# Patient Record
Sex: FEMALE | Race: WHITE | Employment: FULL TIME | URBAN - METROPOLITAN AREA
[De-identification: names, ages, dates, MRNs, and addresses within clinical notes are randomized per-mention and may not be internally consistent; named-entity substitution may affect disease eponyms.]

---

## 2019-11-27 ENCOUNTER — THERAPY VISIT (OUTPATIENT)
Dept: PHYSICAL THERAPY | Facility: CLINIC | Age: 49
End: 2019-11-27
Payer: COMMERCIAL

## 2019-11-27 DIAGNOSIS — M54.42 ACUTE LEFT-SIDED LOW BACK PAIN WITH LEFT-SIDED SCIATICA: ICD-10-CM

## 2019-11-27 PROCEDURE — 97140 MANUAL THERAPY 1/> REGIONS: CPT | Mod: GP | Performed by: PHYSICAL THERAPIST

## 2019-11-27 PROCEDURE — 97110 THERAPEUTIC EXERCISES: CPT | Mod: GP | Performed by: PHYSICAL THERAPIST

## 2019-11-27 PROCEDURE — 97161 PT EVAL LOW COMPLEX 20 MIN: CPT | Mod: GP | Performed by: PHYSICAL THERAPIST

## 2019-11-27 NOTE — LETTER
"[facility] PHYSICAL THERAPY  [address]  [facility] [address]  [phone]    2019    Re: Liliya Avilez   :   1970  MRN:  [id number]   REFERRING PHYSICIAN:   Travis Acevedo    [facility] PHYSICAL [facility]    Date of Initial Evaluation:  2019  Visits:  Rxs Used: 1  Reason for Referral:  Acute left-sided low back pain with left-sided sciatica    EVALUATION SUMMARY    [facility] Initial Evaluation  Subjective:  Liliya Avilez is a 49 year old female.    Patient s chief complaints: low back pain.    Condition occurred due to over use, walk-a-thon, raking/yardwork and moving bricks.  Date of Onset: around 19  Location of symptoms is left low back and into gluteal area.  Noted some L hamstring/ITB tightness prior during the summer .  Symptoms other than pain include: tingling lumbosacral area,  Denies changes in bowel/bladder.  Quality of pain is sharp and frequency is intermittent, but present most of the time this past week. .    Pain dependence on time of day is: not dependent on time of day.   Pain rating is: 10/10.    Symptoms are exacerbated by: sitting, lifting, vacuuming, standing and walking (though sitting/arising is worse than standing/walking).    Symptoms are relieved by:  Sometimes bridging, cobra.    Progression of symptoms is that symptoms are:  Worse today--\"it is a bad day\".  Imaging/Special tests include: none   Previous treatments include: chiropractic care for back, but will hold until pain calms down.   Patient reports that general health is: good .   Pertinent medical history includes:  Headaches, depression/anxiety, foot fracture, osteopenia  Medical allergies includes: nickel.   Surgical history includes: wisdom teeth.  Current medications include: antidepressant, ibuprofen, sleep  Current occupation is:  (does also do some teaching for exercise " classes)  Work status is: working normal job/hours  Primary job tasks include: computer work, driving, lift/carry, sitting/standing, pulling, repetitive work  Barriers include: none  Red flags include: none    Patient's expectations for therapy include: get stretches that help, be painfree, eventual return to lifting weights.   HPI                  Objective:  LUMBAR:  Posture: changes posture and unweight RLside to avoid pain currently  Posture Correction: not assessed  Relevant Lateral Shift: no  Neurological:  Motor Deficit:  Myotomes L R   L1-2 (hip flexion) 5 5   L3 (knee extension) 5 5   L4 (ankle DF) 5 5   L5 (g. toe ext) 5 5   S1 (ankle PF or knee flex) 5 5   Sensory Deficit, Reflexes: intact light touch screen B LE dermatomes  Dural Signs:   L R   Slump negative negative   SLR negative negative   AROM: (Major, Moderate, Minimal or Nil loss)  Movement Loss Joey Mod Min Nil Pain   Flexion  X   Flexion to knees with pain LB, L buttock to post knee   Extension  X   Pain L LB   Side Gliding L   X  Trace discomfort LB   Side Gliding R   X  Trace discomfort LB   Repeated movement testing:   (During: produces, abolishes, increases, decreases, no effect, centralizing, peripheralizing; After: better, worse, no better, no worse, no effect, centralized, peripheralized)  Pre-test Symptoms Standing: L LBP   Symptoms During Symptoms After ROM increased ROM decreased No Effect   FIS        Rep FIS Increased L LB, produced pain post thigh to knee No worse   X   EIS        Rep EIS Increased sharp pain L LB at end range No worse   X   Static Tests: prone lying is with decreased pain/better; SYLVIA is about the same, but back muscles tighten up; modified press up in prone with hands about 12 inches higher than shoulder is well tolerated no effect/no effect during/after  Provisional Classification: likely derangement, but still primarily in acute LBP phase  Principle of Management: will initiate prone progression with prone lying,  SYLVIA and partial/modified press up.  Will establish directional response and move into postural education and core strengthening as indicated.     Assessment/Plan:    Patient is a 49 year old female with lumbar complaints.    Patient has the following significant findings with corresponding treatment plan.                Diagnosis 1:  Acute L LBP    Pain -  hot/cold therapy, electric stimulation, manual therapy and directional preference exercise  Decreased ROM/flexibility - manual therapy and therapeutic exercise  Decreased strength - therapeutic exercise and therapeutic activities  Decreased proprioception - neuro re-education and therapeutic activities  Decreased function - therapeutic activities  Impaired posture - neuro re-education    Therapy Evaluation Codes:   1) History comprised of:   Personal factors that impact the plan of care:      None.    Comorbidity factors that impact the plan of care are:      None.     Medications impacting care: None.  2) Examination of Body Systems comprised of:   Body structures and functions that impact the plan of care:      Lumbar spine.   Activity limitations that impact the plan of care are:      Bathing, Bending, Dressing, Lifting, Running, Sitting, Sports, Squatting/kneeling, Stairs, Walking, Sleeping and Laying down.  3) Clinical presentation characteristics are:   Stable/Uncomplicated.  4) Decision-Making    Low complexity using standardized patient assessment instrument and/or measureable assessment of functional outcome.  Cumulative Therapy Evaluation is: Low complexity.    Previous and current functional limitations:  (See Goal Flow Sheet for this information)    Short term and Long term goals: (See Goal Flow Sheet for this information)     Communication ability:  Patient appears to be able to clearly communicate and understand verbal and written communication and follow directions correctly.  Treatment Explanation - The following has been discussed with the  patient:   RX ordered/plan of care  Anticipated outcomes  Possible risks and side effects  This patient would benefit from PT intervention to resume normal activities.   Rehab potential is good.    Frequency:  1 X week, once daily  Duration:  for 6 weeks  Discharge Plan:  Achieve all LTG.  Independent in home treatment program.  Reach maximal therapeutic benefit.    Thank you for your referral.    INQUIRIES  Therapist: Jamar Aguilera DPT,SCS, Cert MDT  [facility] PHYSICAL THERAPY  [address] [place] [address]  Phone: [phone]  Fax: [phone]

## 2019-11-27 NOTE — PROGRESS NOTES
"[facility] Initial Evaluation  Subjective:  Liliya Avilez is a 49 year old female.    Patient s chief complaints: low back pain.    Condition occurred due to over use, walk-a-thon, raking/yardwork and moving bricks.  Date of Onset: around 9/30/19  Location of symptoms is left low back and into gluteal area.  Noted some L hamstring/ITB tightness prior during the summer .  Symptoms other than pain include: tingling lumbosacral area,  Denies changes in bowel/bladder.  Quality of pain is sharp and frequency is intermittent, but present most of the time this past week. .    Pain dependence on time of day is: not dependent on time of day.   Pain rating is: 10/10.    Symptoms are exacerbated by: sitting, lifting, vacuuming, standing and walking (though sitting/arising is worse than standing/walking).    Symptoms are relieved by:  Sometimes bridging, cobra.    Progression of symptoms is that symptoms are:  Worse today--\"it is a bad day\".  Imaging/Special tests include: none   Previous treatments include: chiropractic care for back, but will hold until pain calms down.   Patient reports that general health is: good .   Pertinent medical history includes:  Headaches, depression/anxiety, foot fracture, osteopenia  Medical allergies includes: nickel.   Surgical history includes: wisdom teeth.  Current medications include: antidepressant, ibuprofen, sleep  Current occupation is:  (does also do some teaching for exercise classes)  Work status is: working normal job/hours  Primary job tasks include: computer work, driving, lift/carry, sitting/standing, pulling, repetitive work  Barriers include: none  Red flags include: none    Patient's expectations for therapy include: get stretches that help, be painfree, eventual return to lifting weights.     HPI                    Objective:  LUMBAR:    Posture: changes posture and unweight RLside to avoid pain currently  Posture Correction: not " assessed  Relevant Lateral Shift: no    Neurological:    Motor Deficit:  Myotomes L R   L1-2 (hip flexion) 5 5   L3 (knee extension) 5 5   L4 (ankle DF) 5 5   L5 (g. toe ext) 5 5   S1 (ankle PF or knee flex) 5 5     Sensory Deficit, Reflexes: intact light touch screen B LE dermatomes    Dural Signs:   L R   Slump negative negative   SLR negative negative       AROM: (Major, Moderate, Minimal or Nil loss)  Movement Loss Joey Mod Min Nil Pain   Flexion  X   Flexion to knees with pain LB, L buttock to post knee   Extension  X   Pain L LB   Side Gliding L   X  Trace discomfort LB   Side Gliding R   X  Trace discomfort LB     Repeated movement testing:   (During: produces, abolishes, increases, decreases, no effect, centralizing, peripheralizing; After: better, worse, no better, no worse, no effect, centralized, peripheralized)    Pre-test Symptoms Standing: L LBP   Symptoms During Symptoms After ROM increased ROM decreased No Effect   FIS        Rep FIS Increased L LB, produced pain post thigh to knee No worse   X   EIS        Rep EIS Increased sharp pain L LB at end range No worse   X     Static Tests: prone lying is with decreased pain/better; SYLVIA is about the same, but back muscles tighten up; modified press up in prone with hands about 12 inches higher than shoulder is well tolerated no effect/no effect during/after    Provisional Classification: likely derangement, but still primarily in acute LBP phase  Principle of Management: will initiate prone progression with prone lying, SYLVIA and partial/modified press up.  Will establish directional response and move into postural education and core strengthening as indicated.     System    Physical Exam    General     ROS    Assessment/Plan:    Patient is a 49 year old female with lumbar complaints.    Patient has the following significant findings with corresponding treatment plan.                Diagnosis 1:  Acute L LBP    Pain -  hot/cold therapy, electric stimulation,  manual therapy and directional preference exercise  Decreased ROM/flexibility - manual therapy and therapeutic exercise  Decreased strength - therapeutic exercise and therapeutic activities  Decreased proprioception - neuro re-education and therapeutic activities  Decreased function - therapeutic activities  Impaired posture - neuro re-education    Therapy Evaluation Codes:   1) History comprised of:   Personal factors that impact the plan of care:      None.    Comorbidity factors that impact the plan of care are:      None.     Medications impacting care: None.  2) Examination of Body Systems comprised of:   Body structures and functions that impact the plan of care:      Lumbar spine.   Activity limitations that impact the plan of care are:      Bathing, Bending, Dressing, Lifting, Running, Sitting, Sports, Squatting/kneeling, Stairs, Walking, Sleeping and Laying down.  3) Clinical presentation characteristics are:   Stable/Uncomplicated.  4) Decision-Making    Low complexity using standardized patient assessment instrument and/or measureable assessment of functional outcome.  Cumulative Therapy Evaluation is: Low complexity.    Previous and current functional limitations:  (See Goal Flow Sheet for this information)    Short term and Long term goals: (See Goal Flow Sheet for this information)     Communication ability:  Patient appears to be able to clearly communicate and understand verbal and written communication and follow directions correctly.  Treatment Explanation - The following has been discussed with the patient:   RX ordered/plan of care  Anticipated outcomes  Possible risks and side effects  This patient would benefit from PT intervention to resume normal activities.   Rehab potential is good.    Frequency:  1 X week, once daily  Duration:  for 6 weeks  Discharge Plan:  Achieve all LTG.  Independent in home treatment program.  Reach maximal therapeutic benefit.    Please refer to the daily flowsheet  for treatment today, total treatment time and time spent performing 1:1 timed codes.

## 2019-11-28 PROBLEM — M54.42 ACUTE LEFT-SIDED LOW BACK PAIN WITH LEFT-SIDED SCIATICA: Status: ACTIVE | Noted: 2019-11-28

## 2019-12-04 ENCOUNTER — THERAPY VISIT (OUTPATIENT)
Dept: PHYSICAL THERAPY | Facility: CLINIC | Age: 49
End: 2019-12-04
Payer: COMMERCIAL

## 2019-12-04 DIAGNOSIS — M54.42 ACUTE LEFT-SIDED LOW BACK PAIN WITH LEFT-SIDED SCIATICA: ICD-10-CM

## 2019-12-04 PROCEDURE — 97140 MANUAL THERAPY 1/> REGIONS: CPT | Mod: GP | Performed by: PHYSICAL THERAPIST

## 2019-12-04 PROCEDURE — 97110 THERAPEUTIC EXERCISES: CPT | Mod: GP | Performed by: PHYSICAL THERAPIST

## 2019-12-12 ENCOUNTER — THERAPY VISIT (OUTPATIENT)
Dept: PHYSICAL THERAPY | Facility: CLINIC | Age: 49
End: 2019-12-12
Payer: COMMERCIAL

## 2019-12-12 DIAGNOSIS — M54.42 ACUTE LEFT-SIDED LOW BACK PAIN WITH LEFT-SIDED SCIATICA: ICD-10-CM

## 2019-12-12 PROCEDURE — 97140 MANUAL THERAPY 1/> REGIONS: CPT | Mod: GP | Performed by: PHYSICAL THERAPIST

## 2019-12-12 PROCEDURE — 97110 THERAPEUTIC EXERCISES: CPT | Mod: GP | Performed by: PHYSICAL THERAPIST

## 2019-12-18 ENCOUNTER — THERAPY VISIT (OUTPATIENT)
Dept: PHYSICAL THERAPY | Facility: CLINIC | Age: 49
End: 2019-12-18
Payer: COMMERCIAL

## 2019-12-18 DIAGNOSIS — M54.42 ACUTE LEFT-SIDED LOW BACK PAIN WITH LEFT-SIDED SCIATICA: ICD-10-CM

## 2019-12-18 PROCEDURE — 97140 MANUAL THERAPY 1/> REGIONS: CPT | Mod: GP | Performed by: PHYSICAL THERAPIST

## 2019-12-18 PROCEDURE — 97110 THERAPEUTIC EXERCISES: CPT | Mod: GP | Performed by: PHYSICAL THERAPIST

## 2019-12-26 ENCOUNTER — THERAPY VISIT (OUTPATIENT)
Dept: PHYSICAL THERAPY | Facility: CLINIC | Age: 49
End: 2019-12-26
Payer: COMMERCIAL

## 2019-12-26 DIAGNOSIS — M54.42 ACUTE LEFT-SIDED LOW BACK PAIN WITH LEFT-SIDED SCIATICA: ICD-10-CM

## 2019-12-26 PROCEDURE — 97110 THERAPEUTIC EXERCISES: CPT | Mod: GP | Performed by: PHYSICAL THERAPIST

## 2019-12-26 PROCEDURE — 97140 MANUAL THERAPY 1/> REGIONS: CPT | Mod: GP | Performed by: PHYSICAL THERAPIST

## 2020-01-02 ENCOUNTER — THERAPY VISIT (OUTPATIENT)
Dept: PHYSICAL THERAPY | Facility: CLINIC | Age: 50
End: 2020-01-02
Payer: COMMERCIAL

## 2020-01-02 DIAGNOSIS — M54.42 ACUTE LEFT-SIDED LOW BACK PAIN WITH LEFT-SIDED SCIATICA: ICD-10-CM

## 2020-01-02 PROCEDURE — 97140 MANUAL THERAPY 1/> REGIONS: CPT | Mod: GP | Performed by: PHYSICAL THERAPIST

## 2020-01-02 PROCEDURE — 97110 THERAPEUTIC EXERCISES: CPT | Mod: GP | Performed by: PHYSICAL THERAPIST

## 2020-01-09 ENCOUNTER — THERAPY VISIT (OUTPATIENT)
Dept: PHYSICAL THERAPY | Facility: CLINIC | Age: 50
End: 2020-01-09
Payer: COMMERCIAL

## 2020-01-09 DIAGNOSIS — M54.42 ACUTE LEFT-SIDED LOW BACK PAIN WITH LEFT-SIDED SCIATICA: ICD-10-CM

## 2020-01-09 PROCEDURE — 97140 MANUAL THERAPY 1/> REGIONS: CPT | Mod: GP | Performed by: PHYSICAL THERAPIST

## 2020-01-09 PROCEDURE — 97110 THERAPEUTIC EXERCISES: CPT | Mod: GP | Performed by: PHYSICAL THERAPIST

## 2020-01-14 ENCOUNTER — THERAPY VISIT (OUTPATIENT)
Dept: PHYSICAL THERAPY | Facility: CLINIC | Age: 50
End: 2020-01-14
Payer: COMMERCIAL

## 2020-01-14 DIAGNOSIS — M54.42 ACUTE LEFT-SIDED LOW BACK PAIN WITH LEFT-SIDED SCIATICA: ICD-10-CM

## 2020-01-14 PROCEDURE — 97140 MANUAL THERAPY 1/> REGIONS: CPT | Mod: GP | Performed by: PHYSICAL THERAPIST

## 2020-01-14 PROCEDURE — 97110 THERAPEUTIC EXERCISES: CPT | Mod: GP | Performed by: PHYSICAL THERAPIST

## 2020-01-17 ENCOUNTER — APPOINTMENT (OUTPATIENT)
Dept: GENERAL RADIOLOGY | Facility: CLINIC | Age: 50
End: 2020-01-17
Attending: EMERGENCY MEDICINE
Payer: COMMERCIAL

## 2020-01-17 ENCOUNTER — APPOINTMENT (OUTPATIENT)
Dept: CT IMAGING | Facility: CLINIC | Age: 50
End: 2020-01-17
Attending: EMERGENCY MEDICINE
Payer: COMMERCIAL

## 2020-01-17 ENCOUNTER — HOSPITAL ENCOUNTER (EMERGENCY)
Facility: CLINIC | Age: 50
Discharge: HOME | End: 2020-01-17
Attending: EMERGENCY MEDICINE | Admitting: EMERGENCY MEDICINE
Payer: COMMERCIAL

## 2020-01-17 VITALS
WEIGHT: 145 LBS | DIASTOLIC BLOOD PRESSURE: 72 MMHG | SYSTOLIC BLOOD PRESSURE: 113 MMHG | BODY MASS INDEX: 21.98 KG/M2 | HEIGHT: 68 IN | TEMPERATURE: 98.5 F | HEART RATE: 70 BPM | OXYGEN SATURATION: 98 % | RESPIRATION RATE: 18 BRPM

## 2020-01-17 DIAGNOSIS — S16.1XXA STRAIN OF NECK MUSCLE, INITIAL ENCOUNTER: ICD-10-CM

## 2020-01-17 DIAGNOSIS — S06.0X0A CONCUSSION WITHOUT LOSS OF CONSCIOUSNESS, INITIAL ENCOUNTER: ICD-10-CM

## 2020-01-17 DIAGNOSIS — F07.81 POST CONCUSSION SYNDROME: ICD-10-CM

## 2020-01-17 PROCEDURE — 99284 EMERGENCY DEPT VISIT MOD MDM: CPT | Mod: 25

## 2020-01-17 PROCEDURE — 72040 X-RAY EXAM NECK SPINE 2-3 VW: CPT

## 2020-01-17 PROCEDURE — 70450 CT HEAD/BRAIN W/O DYE: CPT

## 2020-01-17 RX ORDER — MECLIZINE HYDROCHLORIDE 25 MG/1
25 TABLET ORAL EVERY 6 HOURS PRN
Qty: 20 TABLET | Refills: 0 | Status: SHIPPED | OUTPATIENT
Start: 2020-01-17

## 2020-01-17 RX ORDER — ONDANSETRON 4 MG/1
4 TABLET, ORALLY DISINTEGRATING ORAL EVERY 8 HOURS PRN
Qty: 10 TABLET | Refills: 0 | Status: SHIPPED | OUTPATIENT
Start: 2020-01-17

## 2020-01-17 ASSESSMENT — ENCOUNTER SYMPTOMS
VOMITING: 0
HEADACHES: 1
NECK STIFFNESS: 1
DIZZINESS: 0
FATIGUE: 1
NAUSEA: 1
LIGHT-HEADEDNESS: 1
PHOTOPHOBIA: 1

## 2020-01-17 ASSESSMENT — MIFFLIN-ST. JEOR: SCORE: 1326.22

## 2020-01-17 NOTE — ED TRIAGE NOTES
Pt fell hitting the back of her head Moday- ( from standing- slipped. ) c/o headache and blurry vision. Tired .

## 2020-01-17 NOTE — DISCHARGE INSTRUCTIONS
Tylenol or ibuprofen for pain  Zofran for nausea  Meclizine 25 mg 3 times a day for dizziness  No exercise for the next few days  You may return to work on Monday if you are feeling better

## 2020-01-17 NOTE — ED PROVIDER NOTES
History     Chief Complaint:  Tigre Avilez is a 49 year old female with a history of degenerative disc disease of her lower back with bulging discs x3 who presents after a fall four days ago. The patient reports that she was out walking her dogs down a hill when she slipped on the ice, her feet came out from under her, she fell backwards, and her head bounced off of pure ice. She did not lose consciousness and briefly laid on the ice before getting up and returning home. Since then she notes that she has been experiencing headache, neck stiffness, slight nausea, photophobia, spaciness, fatigue, vision changes, and pressure behind her eyes. She also endorses vertigo but states this has resolved. The patient notes mild improvement of her symptoms with the use of ice and following an adjustment by her chiropractor. The patient denies vomiting. 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    Allergies:  Sulfa drugs  Amoxicillin  Codeine  Nickel  Thimerosel    Medications:    Bupropion  Amphetamine salt combo  Viibryd  Neurontin  Trintellix  Klonopin  Concerta  Ambien  Fiorenal    Past Medical History:    Acute left-sided low back pain with left-sided sciatica  Attention deficit hyperactivity disorder  Insomnia  Migraines  Foot fracture  Depression  Disorder of bone and cartilage  Otitis media  Vitamin D deficiency     Past Surgical History:    History reviewed. No pertinent surgical history.    Family History:    Father: skin cancer, cataract, diabetes, heart disease, hyperlipidemia, hypertension, kidney/bladder disease, thyroid disorder  Mother: skin cancer, cataract, clotting disorder, heart disease, hyperlipidemia, hypertension, migraines, osteoporosis, spontaneous abortions, stroke ,thyroid disorder, urinary incontinence  Brother: depression, hyperlipidemia, ADHD  Daughter:  "enriquetaen  Sister: cancer, hyperlipidemia, spontaneous abortions, urinary incontinence, depression, osteoporosis, ADHD    Social History:  Smoking status: never smoker  Alcohol use: no  Marital Status:     The patient is an , 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    Review of Systems   Constitutional: Positive for fatigue.   HENT:        Pressure behind eyes   Eyes: Positive for photophobia and visual disturbance.   Gastrointestinal: Positive for nausea. Negative for vomiting.   Musculoskeletal: Positive for neck stiffness.   Neurological: Positive for light-headedness and headaches. Negative for dizziness.        No loss of consciousness  Spaciness    All other systems reviewed and are negative.    Physical Exam     Patient Vitals for the past 24 hrs:   BP Temp Temp src Pulse Resp SpO2 Height Weight   01/17/20 1355 113/72 -- -- 70 18 98 % -- --   01/17/20 1202 131/80 98.5  F (36.9  C) Temporal 71 16 98 % 1.727 m (5' 8\") 65.8 kg (145 lb)     Physical Exam  General: Resting comfortably on the gurney  Head:  The scalp, face, and head appear normal    No significant evidence of scalp trauma or bony step-off  Eyes:  The pupils are equal, round, and reactive to light    There is no nystagmus    Extraocular muscles are intact    Conjunctivae and sclerae are normal  ENT:    The nose is normal    Pinnae are normal    The oropharynx is normal    Uvula is in the midline    No evidence of hemotympanum  Neck:  Normal range of motion    There is no rigidity noted    There is no midline cervical spine pain/tenderness    There is mild tenderness involving the paracervical muscles including the   trapezius area bilaterally.    Trachea is in the midline    No mass is detected  CV:  Regular rate and underlying rhythm     Normal S1/S2, no S3/S4    No pathological murmur detected  Resp:  Lungs are clear    There is no tachypnea    Non-labored    No rales    No wheezing   GI:  Abdomen is soft, " there is no rigidity    No distension    No tympani    No rebound tenderness     Non-surgical without peritoneal features  MS:  Normal muscular tone    Symmetric motor strength    No major joint effusions    No asymmetric leg swelling, no calf tenderness  Skin:  No rash or acute skin lesions noted  Neuro: Speech is normal and fluent    GCS 15    Normal motor function throughout  Psych:  Awake. Alert.      Normal affect.  Appropriate interactions.  Lymph: No anterior cervical lymphadenopathy noted    Emergency Department Course     Imaging:  Radiology findings were communicated with the patient who voiced understanding of the findings.    XR Cervical Spine 2/3 Views  No fractures identified. Alignment is significant for slight straightening. Mild degenerative disc disease is present at C5-C6 and C6-C7. Paraspinal soft tissues are unremarkable.  TANESHA NINA MD  Reading per radiology    CT Head w/o Contrast  No evidence of acute intracranial hemorrhage, mass, or herniation.  ELVIS BEDOYA MD  Reading per radiology    Emergency Department Course:    1202 Nursing notes and vitals reviewed.    1206 I performed an exam of the patient as documented above.     1234 The patient was sent for a CT of the head while in the emergency department, results above.     1249 The patient was sent for an xray of the cervical spine while in the emergency department, results above.     1343 Patient rechecked and updated.      Findings and plan explained to the patient. Patient discharged home with instructions regarding supportive care, medications, and reasons to return. The importance of close follow-up was reviewed. The patient was prescribed zofran and Antivert.     Impression & Plan      Medical Decision Making:  Liliya Avilez is a 49 year old female who presents to the emergency department today for evaluation of recent fall and head injury.  Patient clearly suffered a significant concussion 5 days ago.  She is having ongoing  mild postconcussive syndrome of mild headache, some trace vertigo, psychomotor slowing, mild nausea.  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  She was advised that she will need to dramatically reduce her physical exertion is this can intensify the postconcussive syndrome.  CT scan of the head was performed which shows no evidence of occipital skull fracture or intracranial hemorrhage.  Cervical spine x-rays show straightening of the normal cervical lordosis consistent with cervical strain she also has baseline mid cervical spine degenerative disc disease and loss of disc height in the mid cervical spine as noted in the radiology interpretation.  She does not have evidence of cervical radiculopathy or myelopathy.  Patient was advised that she should rest for the next 3 days until she has to teach again.  There will be no cardiovascular exercise or exertion for the next week at least.  That is both personally and with teaching.  She is clear on the instructions.  She is given meclizine and Zofran as needed for symptom control.    Diagnosis:    ICD-10-CM    1. Concussion without loss of consciousness, initial encounter S06.0X0A    2. Strain of neck muscle, initial encounter S16.1XXA    3. Post concussion syndrome F07.81      Disposition:   The patient is discharged to home.    Discharge Medications:  Discharge Medication List as of 1/17/2020  1:48 PM      START taking these medications    Details   meclizine (ANTIVERT) 25 MG tablet Take 1 tablet (25 mg) by mouth every 6 hours as needed for dizziness, Disp-20 tablet, R-0, Local Print      ondansetron (ZOFRAN ODT) 4 MG ODT tab Take 1 tablet (4 mg) by mouth every 8 hours as needed for nausea, Disp-10 tablet, R-0, Local Print           Scribe Disclosure:  Patti SHEPARD, am serving as a scribe at 12:41 PM on 1/17/2020 to document services personally performed by Cayden Ruff MD based  on my observations and the provider's statements to me.     EMERGENCY DEPARTMENT       Cayden Ruff MD  01/17/20 [id number]

## 2020-01-17 NOTE — ED AVS SNAPSHOT
Emergency Department  [address] [place] [address]  Phone:  [phone]  Fax:  [phone]                                    Liliya Avilez   MRN: [id number]    Department:   Emergency Department   Date of Visit:  1/17/2020           After Visit Summary Signature Page    I have received my discharge instructions, and my questions have been answered. I have discussed any challenges I see with this plan with the nurse or doctor.    ..........................................................................................................................................  Patient/Patient Representative Signature      ..........................................................................................................................................  Patient Representative Print Name and Relationship to Patient    ..................................................               ................................................  Date                                   Time    ..........................................................................................................................................  Reviewed by Signature/Title    ...................................................              ..............................................  Date                                               Time          22EPIC Rev 08/18

## 2020-01-29 ENCOUNTER — THERAPY VISIT (OUTPATIENT)
Dept: PHYSICAL THERAPY | Facility: CLINIC | Age: 50
End: 2020-01-29
Payer: COMMERCIAL

## 2020-01-29 DIAGNOSIS — M54.42 ACUTE LEFT-SIDED LOW BACK PAIN WITH LEFT-SIDED SCIATICA: ICD-10-CM

## 2020-01-29 PROCEDURE — 97110 THERAPEUTIC EXERCISES: CPT | Mod: GP | Performed by: PHYSICAL THERAPIST

## 2020-01-29 PROCEDURE — 97140 MANUAL THERAPY 1/> REGIONS: CPT | Mod: GP | Performed by: PHYSICAL THERAPIST

## 2020-02-05 ENCOUNTER — THERAPY VISIT (OUTPATIENT)
Dept: PHYSICAL THERAPY | Facility: CLINIC | Age: 50
End: 2020-02-05
Payer: COMMERCIAL

## 2020-02-05 DIAGNOSIS — M54.42 ACUTE LEFT-SIDED LOW BACK PAIN WITH LEFT-SIDED SCIATICA: ICD-10-CM

## 2020-02-05 DIAGNOSIS — M54.2 CERVICALGIA: ICD-10-CM

## 2020-02-05 PROCEDURE — 97140 MANUAL THERAPY 1/> REGIONS: CPT | Mod: GP | Performed by: PHYSICAL THERAPIST

## 2020-02-05 PROCEDURE — 97161 PT EVAL LOW COMPLEX 20 MIN: CPT | Mod: GP | Performed by: PHYSICAL THERAPIST

## 2020-02-05 NOTE — LETTER
[facility] [place]  [address]  [facility] [address]  [phone]    2020    Re: Liliya Avilez   :   1970  MRN:  [id number]   REFERRING PHYSICIAN:   Nataliia Napoles    [facility] PHYSICAL [facility]    Date of Initial Evaluation:  2020  Visits:     Reason for Referral:     Acute left-sided low back pain with left-sided sciatica  Cervicalgia    EVALUATION SUMMARY    [facility] Initial Evaluation  Subjective:  Liliya Avilez is a 49 year old female.    Patient s chief complaints: neck pain, HA, concussion.    Condition occurred due to walking her dog, in snowy environment outdoors.  She was at the bottom of a hill and it was icey, and she slipped and fell backwards and hit the back of her head on the ground.  Had HA prior and after.  Did go to ED and was diagnosed with concussion, neck stiffness.  Did have CT that was normal and x-ray with degenerative changes.  Date of Onset: 19  Location of symptoms is posterior neck, occipital, top of head and to orbital both sides, in neck more L than R--but feels it on both .  Symptoms other than pain include: neck pain/stiffness, headaches, photophobia, sensitivity to sound. Fogginess.   Quality of pain is aching/dull and frequency is intermittent.    Pain dependence on time of day is: worse as day progresses.   Pain rating is: 7/10.    Symptoms are exacerbated by: light and sound (sensitivity), rotation, reading/computer work, difficulty finding comfort to fall alseep.    Symptoms are relieved by:  Self massage, ice, ibuprofen, tylenol.  Essential oils, water.   Progression of symptoms is that symptoms are:  Was improving end of last week, but more painful weekend and today.  Imaging/Special tests include:   CT Scan normal.  X-ray:  IMPRESSION: No fractures identified. Alignment is significant for  slight straightening. Mild degenerative  disc disease is present at  C5-C6 and C6-C7. Paraspinal soft tissues are unremarkable.   Previous treatments include: none.   Patient reports that general health is: good .   Pertinent medical history includes:  Headaches, depression/anxiety, foot fracture, osteopenia  Medical allergies includes: nickel.   Surgical history includes: wisdom teeth.  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   HPI                    Objective:  CERVICAL:  Posture: fair.  Posture Correction: no effect  Neurological:  Motor Deficit:  Myotomes L R   C4 (shoulder elevation) 5 5   C5 (shoulder abduction) 5 5   C6 (elbow flexion) 5 5   C7 (elbow extension) 5 5   C8 (thumb extension) 5 5   T1 (finger add/abd) 5 5    Strength (lb) WNL WNL   Sensory Deficit, Reflexes, Dural Signs: intact light touch screen B UE dermatomes.  AROM: (Major, Moderate, Minimal or Nil loss)  Movement Loss Joey Mod Min Nil Pain   Protrusion    X No effect   Flexion    X No effect   Retraction   X  No effect   Extension   X  Pain post neck   Left Rotation   X  No effect   Right Rotation  X   Stiff/pain   Left Side Bending  X X  Tight/pain   Right Side bending  X X  Tight/pain   Repeated movement testing:   (During: produces, abolishes, increases, decreases, no effect, centralizing, peripheralizing; After: better, worse, no better, no worse, no effect, centralized, peripheralized)  Pre-test Symptoms Sitting: pain post neck, HA top of head and to B orbital areas.    Symptoms During Symptoms After ROM increased ROM decreased No Effect   PRO        Rep PRO        RET        Rep RET No effect No effect   X   RET EXT        Rep RET EXT        LF - R        Rep LF - R No effect No effect   X   LF - L        Rep LF - L        ROT - R        Rep ROT - R        ROT - L        Rep ROT - L        FLEX        Rep FLEX        Retraction with patient overpressure, increased pain during local to neck, no worse after, increased R rotation AROM after.  Sustained retraction: no effect on HA initially, no better/no worse  Upper cervical flexion repeated and sustained, increased pain during local, no worse after, no effect on AROM.   Other Tests: hypomobility mid/upper cervical.  Palpation with tightness post neck, suboccipitals and upper traps  Provisional Classification: other, trauma  Principle of Management: will initiate retraction in sitting repeated for neck pain and AROM; sustained can trial x 1 min for headache.  Will work on scapular stabilization, posture.  Will work on joint mobility and soft tissue mobilization as well.   Will monitor concussion symptoms and refer back to primary care provider to assess concussion and recommend services if non neck pain/HA symptoms currently present aren't decreasing with time.     Assessment/Plan:    Patient is a 49 year old female with cervical complaints.    Patient has the following significant findings with corresponding treatment plan.                Diagnosis 1:  Cervicalgia/HA after fall (concussion as well)    Pain -  hot/cold therapy, manual therapy and directional preference exercise  Decreased ROM/flexibility - manual therapy and therapeutic exercise  Decreased strength - therapeutic exercise and therapeutic activities  Decreased proprioception - neuro re-education and therapeutic activities  Decreased function - therapeutic activities    Therapy Evaluation Codes:   1) History comprised of:   Personal factors that impact the plan of care:       None.    Comorbidity factors that impact the plan of care are:      None.     Medications impacting care: None.  2) Examination of Body Systems comprised of:   Body structures and functions that impact the plan of care:      Cervical spine and Lumbar spine.   Activity limitations that impact the plan of care are:      Driving, Lifting, Reading/Computer work and Laying down.  3) Clinical presentation characteristics are:   Stable/Uncomplicated.  4) Decision-Making    Low complexity using standardized patient assessment instrument and/or measureable assessment of functional outcome.  Cumulative Therapy Evaluation is: Low complexity.    Previous and current functional limitations:  (See Goal Flow Sheet for this information)    Short term and Long term goals: (See Goal Flow Sheet for this information)     Communication ability:  Patient appears to be able to clearly communicate and understand verbal and written communication and follow directions correctly.  Treatment Explanation - The following has been discussed with the patient:   RX ordered/plan of care  Anticipated outcomes  Possible risks and side effects  This patient would benefit from PT intervention to resume normal activities.   Rehab potential is good.    Frequency:  1 X week, once daily  Duration:  for 8 weeks  Discharge Plan:  Achieve all LTG.  Independent in home treatment program.  Reach maximal therapeutic benefit.    Thank you for your referral.    INQUIRIES  Therapist: Jamar Aguilera, DPT, SCS, Cert MDT  [facility] [place] [facility]  [address] [place] [address] [address] [address]  Phone: [phone]  Fax: [phone]

## 2020-02-05 NOTE — PROGRESS NOTES
[facility] Initial Evaluation  Subjective:  Liliya Avilez is a 49 year old female.    Patient s chief complaints: neck pain, HA, concussion.    Condition occurred due to walking her dog, in snowy environment outdoors.  She was at the bottom of a hill and it was icey, and she slipped and fell backwards and hit the back of her head on the ground.  Had HA prior and after.  Did go to ED and was diagnosed with concussion, neck stiffness.  Did have CT that was normal and x-ray with degenerative changes.  Date of Onset: 1/13/19  Location of symptoms is posterior neck, occipital, top of head and to orbital both sides, in neck more L than R--but feels it on both .  Symptoms other than pain include: neck pain/stiffness, headaches, photophobia, sensitivity to sound. Fogginess.   Quality of pain is aching/dull and frequency is intermittent.    Pain dependence on time of day is: worse as day progresses.   Pain rating is: 7/10.    Symptoms are exacerbated by: light and sound (sensitivity), rotation, reading/computer work, difficulty finding comfort to fall alseep.    Symptoms are relieved by:  Self massage, ice, ibuprofen, tylenol.  Essential oils, water.   Progression of symptoms is that symptoms are:  Was improving end of last week, but more painful weekend and today.  Imaging/Special tests include:   CT Scan normal.  X-ray:  IMPRESSION: No fractures identified. Alignment is significant for  slight straightening. Mild degenerative disc disease is present at  C5-C6 and C6-C7. Paraspinal soft tissues are unremarkable.   Previous treatments include: none.   Patient reports that general health is: good .   Pertinent medical history includes:  Headaches, depression/anxiety, foot fracture, osteopenia  Medical allergies includes: nickel.   Surgical history includes: wisdom teeth.  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     HPI                    Objective:  CERVICAL:    Posture: fair.  Posture Correction: no effect    Neurological:    Motor Deficit:  Myotomes L R   C4 (shoulder elevation) 5 5   C5 (shoulder abduction) 5 5   C6 (elbow flexion) 5 5   C7 (elbow extension) 5 5   C8 (thumb extension) 5 5   T1 (finger add/abd) 5 5    Strength (lb) WNL WNL     Sensory Deficit, Reflexes, Dural Signs: intact light touch screen B UE dermatomes.    AROM: (Major, Moderate, Minimal or Nil loss)  Movement Loss Joey Mod Min Nil Pain   Protrusion    X No effect   Flexion    X No effect   Retraction   X  No effect   Extension   X  Pain post neck   Left Rotation   X  No effect   Right Rotation  X   Stiff/pain   Left Side Bending  X X  Tight/pain   Right Side bending  X X  Tight/pain     Repeated movement testing:   (During: produces, abolishes, increases, decreases, no effect, centralizing, peripheralizing; After: better, worse, no better, no worse, no effect, centralized, peripheralized)    Pre-test Symptoms Sitting: pain post neck, HA top of head and to B orbital areas.   Symptoms During Symptoms After ROM increased ROM decreased No Effect   PRO        Rep PRO        RET        Rep RET No effect No effect   X   RET EXT        Rep RET EXT        LF - R        Rep LF - R No effect No effect   X   LF - L        Rep LF - L        ROT - R        Rep ROT - R        ROT - L        Rep ROT - L        FLEX        Rep FLEX          Retraction with patient overpressure, increased pain during local to neck, no worse after, increased R  rotation AROM after.  Sustained retraction: no effect on HA initially, no better/no worse  Upper cervical flexion repeated and sustained, increased pain during local, no worse after, no effect on AROM.     Other Tests: hypomobility mid/upper cervical.  Palpation with tightness post neck, suboccipitals and upper traps    Provisional Classification: other, trauma  Principle of Management: will initiate retraction in sitting repeated for neck pain and AROM; sustained can trial x 1 min for headache.  Will work on scapular stabilization, posture.  Will work on joint mobility and soft tissue mobilization as well.   Will monitor concussion symptoms and refer back to primary care provider to assess concussion and recommend services if non neck pain/HA symptoms currently present aren't decreasing with time.       System    Physical Exam    General     ROS    Assessment/Plan:    Patient is a 49 year old female with cervical complaints.    Patient has the following significant findings with corresponding treatment plan.                Diagnosis 1:  Cervicalgia/HA after fall (concussion as well)    Pain -  hot/cold therapy, manual therapy and directional preference exercise  Decreased ROM/flexibility - manual therapy and therapeutic exercise  Decreased strength - therapeutic exercise and therapeutic activities  Decreased proprioception - neuro re-education and therapeutic activities  Decreased function - therapeutic activities    Therapy Evaluation Codes:   1) History comprised of:   Personal factors that impact the plan of care:      None.    Comorbidity factors that impact the plan of care are:      None.     Medications impacting care: None.  2) Examination of Body Systems comprised of:   Body structures and functions that impact the plan of care:      Cervical spine and Lumbar spine.   Activity limitations that impact the plan of care are:      Driving, Lifting, Reading/Computer work and Laying down.  3) Clinical  presentation characteristics are:   Stable/Uncomplicated.  4) Decision-Making    Low complexity using standardized patient assessment instrument and/or measureable assessment of functional outcome.  Cumulative Therapy Evaluation is: Low complexity.    Previous and current functional limitations:  (See Goal Flow Sheet for this information)    Short term and Long term goals: (See Goal Flow Sheet for this information)     Communication ability:  Patient appears to be able to clearly communicate and understand verbal and written communication and follow directions correctly.  Treatment Explanation - The following has been discussed with the patient:   RX ordered/plan of care  Anticipated outcomes  Possible risks and side effects  This patient would benefit from PT intervention to resume normal activities.   Rehab potential is good.    Frequency:  1 X week, once daily  Duration:  for 8 weeks  Discharge Plan:  Achieve all LTG.  Independent in home treatment program.  Reach maximal therapeutic benefit.    Please refer to the daily flowsheet for treatment today, total treatment time and time spent performing 1:1 timed codes.

## 2020-02-17 ENCOUNTER — THERAPY VISIT (OUTPATIENT)
Dept: PHYSICAL THERAPY | Facility: CLINIC | Age: 50
End: 2020-02-17
Payer: COMMERCIAL

## 2020-02-17 DIAGNOSIS — M54.42 ACUTE LEFT-SIDED LOW BACK PAIN WITH LEFT-SIDED SCIATICA: ICD-10-CM

## 2020-02-17 DIAGNOSIS — M54.2 CERVICALGIA: ICD-10-CM

## 2020-02-17 PROCEDURE — 97110 THERAPEUTIC EXERCISES: CPT | Mod: GP | Performed by: PHYSICAL THERAPIST

## 2020-02-17 PROCEDURE — 97140 MANUAL THERAPY 1/> REGIONS: CPT | Mod: GP | Performed by: PHYSICAL THERAPIST

## 2020-03-02 ENCOUNTER — THERAPY VISIT (OUTPATIENT)
Dept: PHYSICAL THERAPY | Facility: CLINIC | Age: 50
End: 2020-03-02
Payer: COMMERCIAL

## 2020-03-02 DIAGNOSIS — M54.42 ACUTE LEFT-SIDED LOW BACK PAIN WITH LEFT-SIDED SCIATICA: ICD-10-CM

## 2020-03-02 DIAGNOSIS — M54.2 CERVICALGIA: ICD-10-CM

## 2020-03-02 PROCEDURE — 97140 MANUAL THERAPY 1/> REGIONS: CPT | Mod: GP | Performed by: PHYSICAL THERAPIST

## 2020-03-02 PROCEDURE — 97110 THERAPEUTIC EXERCISES: CPT | Mod: GP | Performed by: PHYSICAL THERAPIST

## 2020-03-11 ENCOUNTER — THERAPY VISIT (OUTPATIENT)
Dept: PHYSICAL THERAPY | Facility: CLINIC | Age: 50
End: 2020-03-11
Payer: COMMERCIAL

## 2020-03-11 DIAGNOSIS — M54.42 ACUTE LEFT-SIDED LOW BACK PAIN WITH LEFT-SIDED SCIATICA: ICD-10-CM

## 2020-03-11 DIAGNOSIS — M54.2 CERVICALGIA: ICD-10-CM

## 2020-03-11 PROCEDURE — 97110 THERAPEUTIC EXERCISES: CPT | Mod: GP | Performed by: PHYSICAL THERAPIST

## 2020-03-11 PROCEDURE — 97140 MANUAL THERAPY 1/> REGIONS: CPT | Mod: GP | Performed by: PHYSICAL THERAPIST

## 2020-04-14 ENCOUNTER — TELEPHONE (OUTPATIENT)
Dept: PHYSICAL THERAPY | Facility: CLINIC | Age: 50
End: 2020-04-14

## 2020-04-14 NOTE — TELEPHONE ENCOUNTER
Called and left message to reschedule 4/23 appt to 5/4 or later due to COVID Guidelines.  Also, gave options for virtual care. -SR

## 2020-04-15 ENCOUNTER — TELEPHONE (OUTPATIENT)
Dept: PHYSICAL THERAPY | Facility: CLINIC | Age: 50
End: 2020-04-15

## 2020-04-16 NOTE — TELEPHONE ENCOUNTER
Called patient x3 and left messages.  Will cancel 4/23/20 PT appt.  Will add to list of patients to call for when resuming standard PT care for all non-critical/essential PT with respect to COVID-19 guidelines. Encouraged patient to call PT or clinic to discuss or to schedule virtual care if needed for the time being.  -SR

## 2020-06-15 ENCOUNTER — THERAPY VISIT (OUTPATIENT)
Dept: PHYSICAL THERAPY | Facility: CLINIC | Age: 50
End: 2020-06-15
Payer: COMMERCIAL

## 2020-06-15 DIAGNOSIS — M54.2 CERVICALGIA: ICD-10-CM

## 2020-06-15 DIAGNOSIS — M54.42 ACUTE LEFT-SIDED LOW BACK PAIN WITH LEFT-SIDED SCIATICA: ICD-10-CM

## 2020-06-15 PROCEDURE — 97140 MANUAL THERAPY 1/> REGIONS: CPT | Mod: GP | Performed by: PHYSICAL THERAPIST

## 2020-06-15 PROCEDURE — 97110 THERAPEUTIC EXERCISES: CPT | Mod: GP | Performed by: PHYSICAL THERAPIST

## 2020-06-15 NOTE — LETTER
[facility]  [address]  [facility] [address]  [phone]    2020    Re: Liliya Avilez   :   1970  MRN:  [id number]   REFERRING PHYSICIAN:   Travis Acevedo    [facility]    Date of Initial Evaluation:  2019  Visits:  Rxs Used: 13  Reason for Referral:     Acute left-sided low back pain with left-sided sciatica  Cervicalgia    EVALUATION SUMMARY    PROGRESS  REPORT  Progress reporting period is from 19 to 6/15/20 (13 LB and 5 cervical).   (break in care recently last 3 months due to COVID-19 changes to in person care)    SUBJECTIVE  Subjective changes noted by patient:  Notes intermittent LB tightness and pain LB, lateral thigh (ITB to anterior knee).  Neck is tight.  Light sensitivity is better.  HA still at times. Pain 2/10 when wakes, increases to 5/10 with activities like shoveling, gardening    Current Pain level: (2-5/10).   Initial Pain level: 7/10. Changes in function:  Yes (See Goal flowsheet attached for changes in current functional level).  Adverse reaction to treatment or activity: None    OBJECTIVE  Changes noted in objective findings:  Yes,   Objective:   Cervical AROM: discomfort with B rot and SB with min-mod loss.    Lumbar AROM: flex to distal 1/3 of tibia with no pain, ext min loss with LBP, L SG L LBP, R SG just tight.     ASSESSMENT/PLAN  Updated problem list and treatment plan: Diagnosis 1:  LBP and neck pain    Pain -  hot/cold therapy, manual therapy, directional preference exercise and home program  Decreased ROM/flexibility - manual therapy and therapeutic exercise  Decreased strength - therapeutic exercise and therapeutic activities  Decreased proprioception - neuro re-education and therapeutic activities  Decreased function - therapeutic activities  Impaired posture - neuro re-education  STG/LTGs have been met or progress has been made towards  goals:  Will re establish goals after break in care (See Goal flow sheet completed today.)  Assessment of Progress: The patient's condition has potential to improve.  Self Management Plans:  Patient has been instructed in a home treatment program.    Re: Liliya Avilez   :   1970- page 2    I have re-evaluated this patient and find that the nature, scope, duration and intensity of the therapy is appropriate for the medical condition of the patient.  Liliya continues to require the following intervention to meet STG and LTG's:  PT    Recommendations:  This patient would benefit from continued therapy.     Frequency:  1 X week, once daily  Duration:  for 6 weeks    Thank you for your referral.    INQUIRIES  Therapist: Jamar Aguilera DPT   [facility] [place] [facility]  [address] [place] [address]  Phone: [phone]  Fax: [phone]

## 2020-06-16 NOTE — PROGRESS NOTES
PROGRESS  REPORT    Progress reporting period is from 11/27/19 to 6/15/20 (13 LB and 5 cervical).   (break in care recently last 3 months due to COVID-19 changes to in person care)    SUBJECTIVE  Subjective changes noted by patient:  Notes intermittent LB tightness and pain LB, lateral thigh (ITB to anterior knee).  Neck is tight.  Light sensitivity is better.  HA still at times. Pain 2/10 when wakes, increases to 5/10 with activities like shoveling, gardening    Current Pain level: (2-5/10).     Initial Pain level: 7/10.   Changes in function:  Yes (See Goal flowsheet attached for changes in current functional level)  Adverse reaction to treatment or activity: None    OBJECTIVE  Changes noted in objective findings:  Yes,   Objective:   Cervical AROM: discomfort with B rot and SB with min-mod loss.    Lumbar AROM: flex to distal 1/3 of tibia with no pain, ext min loss with LBP, L SG L LBP, R SG just tight.     ASSESSMENT/PLAN  Updated problem list and treatment plan: Diagnosis 1:  LBP and neck pain    Pain -  hot/cold therapy, manual therapy, directional preference exercise and home program  Decreased ROM/flexibility - manual therapy and therapeutic exercise  Decreased strength - therapeutic exercise and therapeutic activities  Decreased proprioception - neuro re-education and therapeutic activities  Decreased function - therapeutic activities  Impaired posture - neuro re-education  STG/LTGs have been met or progress has been made towards goals:  Will re establish goals after break in care (See Goal flow sheet completed today.)  Assessment of Progress: The patient's condition has potential to improve.  Self Management Plans:  Patient has been instructed in a home treatment program.  I have re-evaluated this patient and find that the nature, scope, duration and intensity of the therapy is appropriate for the medical condition of the patient.  Liliya continues to require the following intervention to meet STG and  LTG's:  PT    Recommendations:  This patient would benefit from continued therapy.     Frequency:  1 X week, once daily  Duration:  for 6 weeks        Please refer to the daily flowsheet for treatment today, total treatment time and time spent performing 1:1 timed codes.

## 2020-06-23 ENCOUNTER — THERAPY VISIT (OUTPATIENT)
Dept: PHYSICAL THERAPY | Facility: CLINIC | Age: 50
End: 2020-06-23
Payer: COMMERCIAL

## 2020-06-23 DIAGNOSIS — M54.2 CERVICALGIA: ICD-10-CM

## 2020-06-23 DIAGNOSIS — M54.42 ACUTE LEFT-SIDED LOW BACK PAIN WITH LEFT-SIDED SCIATICA: ICD-10-CM

## 2020-06-23 PROCEDURE — 97110 THERAPEUTIC EXERCISES: CPT | Mod: GP | Performed by: PHYSICAL THERAPIST

## 2020-06-23 PROCEDURE — 97140 MANUAL THERAPY 1/> REGIONS: CPT | Mod: GP | Performed by: PHYSICAL THERAPIST

## 2020-06-30 ENCOUNTER — THERAPY VISIT (OUTPATIENT)
Dept: PHYSICAL THERAPY | Facility: CLINIC | Age: 50
End: 2020-06-30
Payer: COMMERCIAL

## 2020-06-30 DIAGNOSIS — M54.42 ACUTE LEFT-SIDED LOW BACK PAIN WITH LEFT-SIDED SCIATICA: ICD-10-CM

## 2020-06-30 DIAGNOSIS — M54.2 CERVICALGIA: ICD-10-CM

## 2020-06-30 PROCEDURE — 97110 THERAPEUTIC EXERCISES: CPT | Mod: GP | Performed by: PHYSICAL THERAPIST

## 2020-06-30 PROCEDURE — 97140 MANUAL THERAPY 1/> REGIONS: CPT | Mod: GP | Performed by: PHYSICAL THERAPIST

## 2020-07-22 ENCOUNTER — THERAPY VISIT (OUTPATIENT)
Dept: PHYSICAL THERAPY | Facility: CLINIC | Age: 50
End: 2020-07-22
Payer: COMMERCIAL

## 2020-07-22 DIAGNOSIS — M54.2 CERVICALGIA: ICD-10-CM

## 2020-07-22 DIAGNOSIS — M54.42 ACUTE LEFT-SIDED LOW BACK PAIN WITH LEFT-SIDED SCIATICA: ICD-10-CM

## 2020-07-22 PROCEDURE — 97110 THERAPEUTIC EXERCISES: CPT | Mod: GP | Performed by: PHYSICAL THERAPIST

## 2020-07-22 PROCEDURE — 97140 MANUAL THERAPY 1/> REGIONS: CPT | Mod: GP | Performed by: PHYSICAL THERAPIST

## 2020-08-10 ENCOUNTER — THERAPY VISIT (OUTPATIENT)
Dept: PHYSICAL THERAPY | Facility: CLINIC | Age: 50
End: 2020-08-10
Payer: COMMERCIAL

## 2020-08-10 DIAGNOSIS — M54.2 CERVICALGIA: ICD-10-CM

## 2020-08-10 DIAGNOSIS — M54.42 ACUTE LEFT-SIDED LOW BACK PAIN WITH LEFT-SIDED SCIATICA: ICD-10-CM

## 2020-08-10 PROCEDURE — 97140 MANUAL THERAPY 1/> REGIONS: CPT | Mod: GP | Performed by: PHYSICAL THERAPIST

## 2020-08-10 PROCEDURE — 97110 THERAPEUTIC EXERCISES: CPT | Mod: GP | Performed by: PHYSICAL THERAPIST

## 2020-08-20 ENCOUNTER — THERAPY VISIT (OUTPATIENT)
Dept: PHYSICAL THERAPY | Facility: CLINIC | Age: 50
End: 2020-08-20
Payer: COMMERCIAL

## 2020-08-20 DIAGNOSIS — M54.2 CERVICALGIA: ICD-10-CM

## 2020-08-20 DIAGNOSIS — M54.42 ACUTE LEFT-SIDED LOW BACK PAIN WITH LEFT-SIDED SCIATICA: ICD-10-CM

## 2020-08-20 PROCEDURE — 97110 THERAPEUTIC EXERCISES: CPT | Mod: GP | Performed by: PHYSICAL THERAPIST

## 2020-08-20 PROCEDURE — 97140 MANUAL THERAPY 1/> REGIONS: CPT | Mod: GP | Performed by: PHYSICAL THERAPIST

## 2020-09-01 ENCOUNTER — THERAPY VISIT (OUTPATIENT)
Dept: PHYSICAL THERAPY | Facility: CLINIC | Age: 50
End: 2020-09-01
Payer: COMMERCIAL

## 2020-09-01 DIAGNOSIS — M54.42 ACUTE LEFT-SIDED LOW BACK PAIN WITH LEFT-SIDED SCIATICA: ICD-10-CM

## 2020-09-01 PROCEDURE — 97110 THERAPEUTIC EXERCISES: CPT | Mod: GP | Performed by: PHYSICAL THERAPIST

## 2020-09-01 PROCEDURE — 97140 MANUAL THERAPY 1/> REGIONS: CPT | Mod: GP | Performed by: PHYSICAL THERAPIST

## 2020-09-08 ENCOUNTER — THERAPY VISIT (OUTPATIENT)
Dept: PHYSICAL THERAPY | Facility: CLINIC | Age: 50
End: 2020-09-08
Payer: COMMERCIAL

## 2020-09-08 DIAGNOSIS — M54.2 CERVICALGIA: ICD-10-CM

## 2020-09-08 PROCEDURE — 97110 THERAPEUTIC EXERCISES: CPT | Mod: GP | Performed by: PHYSICAL THERAPIST

## 2020-09-08 PROCEDURE — 97140 MANUAL THERAPY 1/> REGIONS: CPT | Mod: GP | Performed by: PHYSICAL THERAPIST

## 2020-09-16 ENCOUNTER — THERAPY VISIT (OUTPATIENT)
Dept: PHYSICAL THERAPY | Facility: CLINIC | Age: 50
End: 2020-09-16
Payer: COMMERCIAL

## 2020-09-16 DIAGNOSIS — M54.42 ACUTE LEFT-SIDED LOW BACK PAIN WITH LEFT-SIDED SCIATICA: ICD-10-CM

## 2020-09-16 DIAGNOSIS — M54.2 CERVICALGIA: ICD-10-CM

## 2020-09-16 PROCEDURE — 97112 NEUROMUSCULAR REEDUCATION: CPT | Mod: GP | Performed by: PHYSICAL THERAPIST

## 2020-09-16 PROCEDURE — 97110 THERAPEUTIC EXERCISES: CPT | Mod: GP | Performed by: PHYSICAL THERAPIST

## 2020-09-16 PROCEDURE — 97140 MANUAL THERAPY 1/> REGIONS: CPT | Mod: GP | Performed by: PHYSICAL THERAPIST

## 2020-09-24 ENCOUNTER — THERAPY VISIT (OUTPATIENT)
Dept: PHYSICAL THERAPY | Facility: CLINIC | Age: 50
End: 2020-09-24
Payer: COMMERCIAL

## 2020-09-24 DIAGNOSIS — M54.42 ACUTE LEFT-SIDED LOW BACK PAIN WITH LEFT-SIDED SCIATICA: ICD-10-CM

## 2020-09-24 PROCEDURE — 97140 MANUAL THERAPY 1/> REGIONS: CPT | Mod: GP | Performed by: PHYSICAL THERAPIST

## 2020-09-24 PROCEDURE — 97110 THERAPEUTIC EXERCISES: CPT | Mod: GP | Performed by: PHYSICAL THERAPIST

## 2020-10-07 ENCOUNTER — THERAPY VISIT (OUTPATIENT)
Dept: PHYSICAL THERAPY | Facility: CLINIC | Age: 50
End: 2020-10-07
Payer: COMMERCIAL

## 2020-10-07 DIAGNOSIS — M54.42 ACUTE LEFT-SIDED LOW BACK PAIN WITH LEFT-SIDED SCIATICA: Primary | ICD-10-CM

## 2020-10-07 PROCEDURE — 97140 MANUAL THERAPY 1/> REGIONS: CPT | Mod: GP | Performed by: PHYSICAL THERAPIST

## 2020-10-07 PROCEDURE — 97110 THERAPEUTIC EXERCISES: CPT | Mod: GP | Performed by: PHYSICAL THERAPIST

## 2020-10-26 ENCOUNTER — THERAPY VISIT (OUTPATIENT)
Dept: PHYSICAL THERAPY | Facility: CLINIC | Age: 50
End: 2020-10-26
Payer: COMMERCIAL

## 2020-10-26 DIAGNOSIS — M54.42 ACUTE LEFT-SIDED LOW BACK PAIN WITH LEFT-SIDED SCIATICA: Primary | ICD-10-CM

## 2020-10-26 PROCEDURE — 97140 MANUAL THERAPY 1/> REGIONS: CPT | Mod: GP | Performed by: PHYSICAL THERAPIST

## 2020-10-26 PROCEDURE — 97110 THERAPEUTIC EXERCISES: CPT | Mod: GP | Performed by: PHYSICAL THERAPIST

## 2020-11-12 ENCOUNTER — THERAPY VISIT (OUTPATIENT)
Dept: PHYSICAL THERAPY | Facility: CLINIC | Age: 50
End: 2020-11-12
Payer: COMMERCIAL

## 2020-11-12 DIAGNOSIS — M54.42 ACUTE LEFT-SIDED LOW BACK PAIN WITH LEFT-SIDED SCIATICA: Primary | ICD-10-CM

## 2020-11-12 PROCEDURE — 97140 MANUAL THERAPY 1/> REGIONS: CPT | Mod: GP | Performed by: PHYSICAL THERAPIST

## 2020-11-12 PROCEDURE — 97110 THERAPEUTIC EXERCISES: CPT | Mod: GP | Performed by: PHYSICAL THERAPIST

## 2020-11-30 ENCOUNTER — THERAPY VISIT (OUTPATIENT)
Dept: PHYSICAL THERAPY | Facility: CLINIC | Age: 50
End: 2020-11-30
Payer: COMMERCIAL

## 2020-11-30 DIAGNOSIS — M54.2 CERVICALGIA: Primary | ICD-10-CM

## 2020-11-30 PROCEDURE — 97110 THERAPEUTIC EXERCISES: CPT | Mod: GP | Performed by: PHYSICAL THERAPIST

## 2020-11-30 PROCEDURE — 97140 MANUAL THERAPY 1/> REGIONS: CPT | Mod: GP | Performed by: PHYSICAL THERAPIST

## 2020-12-17 ENCOUNTER — THERAPY VISIT (OUTPATIENT)
Dept: PHYSICAL THERAPY | Facility: CLINIC | Age: 50
End: 2020-12-17
Payer: COMMERCIAL

## 2020-12-17 DIAGNOSIS — M54.2 CERVICALGIA: Primary | ICD-10-CM

## 2020-12-17 PROCEDURE — 97110 THERAPEUTIC EXERCISES: CPT | Mod: GP | Performed by: PHYSICAL THERAPIST

## 2020-12-17 PROCEDURE — 97140 MANUAL THERAPY 1/> REGIONS: CPT | Mod: GP | Performed by: PHYSICAL THERAPIST

## 2020-12-29 ENCOUNTER — THERAPY VISIT (OUTPATIENT)
Dept: PHYSICAL THERAPY | Facility: CLINIC | Age: 50
End: 2020-12-29
Payer: COMMERCIAL

## 2020-12-29 DIAGNOSIS — M54.2 CERVICALGIA: Primary | ICD-10-CM

## 2020-12-29 PROCEDURE — 97140 MANUAL THERAPY 1/> REGIONS: CPT | Mod: GP | Performed by: PHYSICAL THERAPIST

## 2020-12-29 PROCEDURE — 97110 THERAPEUTIC EXERCISES: CPT | Mod: GP | Performed by: PHYSICAL THERAPIST

## 2021-01-14 ENCOUNTER — THERAPY VISIT (OUTPATIENT)
Dept: PHYSICAL THERAPY | Facility: CLINIC | Age: 51
End: 2021-01-14
Payer: COMMERCIAL

## 2021-01-14 DIAGNOSIS — M54.42 ACUTE LEFT-SIDED LOW BACK PAIN WITH LEFT-SIDED SCIATICA: Primary | ICD-10-CM

## 2021-01-14 PROCEDURE — 97110 THERAPEUTIC EXERCISES: CPT | Mod: GP | Performed by: PHYSICAL THERAPIST

## 2021-01-14 PROCEDURE — 97140 MANUAL THERAPY 1/> REGIONS: CPT | Mod: GP | Performed by: PHYSICAL THERAPIST

## 2021-01-28 ENCOUNTER — THERAPY VISIT (OUTPATIENT)
Dept: PHYSICAL THERAPY | Facility: CLINIC | Age: 51
End: 2021-01-28
Payer: COMMERCIAL

## 2021-01-28 DIAGNOSIS — M54.42 ACUTE LEFT-SIDED LOW BACK PAIN WITH LEFT-SIDED SCIATICA: Primary | ICD-10-CM

## 2021-01-28 PROCEDURE — 97140 MANUAL THERAPY 1/> REGIONS: CPT | Mod: GP | Performed by: PHYSICAL THERAPIST

## 2021-01-28 PROCEDURE — 97110 THERAPEUTIC EXERCISES: CPT | Mod: GP | Performed by: PHYSICAL THERAPIST

## 2021-02-11 ENCOUNTER — THERAPY VISIT (OUTPATIENT)
Dept: PHYSICAL THERAPY | Facility: CLINIC | Age: 51
End: 2021-02-11
Payer: COMMERCIAL

## 2021-02-11 DIAGNOSIS — M54.2 CERVICALGIA: Primary | ICD-10-CM

## 2021-02-11 PROCEDURE — 97140 MANUAL THERAPY 1/> REGIONS: CPT | Mod: GP | Performed by: PHYSICAL THERAPIST

## 2021-02-11 PROCEDURE — 97110 THERAPEUTIC EXERCISES: CPT | Mod: GP | Performed by: PHYSICAL THERAPIST

## 2021-02-25 ENCOUNTER — THERAPY VISIT (OUTPATIENT)
Dept: PHYSICAL THERAPY | Facility: CLINIC | Age: 51
End: 2021-02-25
Payer: COMMERCIAL

## 2021-02-25 DIAGNOSIS — M54.42 ACUTE LEFT-SIDED LOW BACK PAIN WITH LEFT-SIDED SCIATICA: Primary | ICD-10-CM

## 2021-02-25 PROCEDURE — 97140 MANUAL THERAPY 1/> REGIONS: CPT | Mod: GP | Performed by: PHYSICAL THERAPIST

## 2021-02-25 PROCEDURE — 97110 THERAPEUTIC EXERCISES: CPT | Mod: GP | Performed by: PHYSICAL THERAPIST

## 2021-03-25 ENCOUNTER — THERAPY VISIT (OUTPATIENT)
Dept: PHYSICAL THERAPY | Facility: CLINIC | Age: 51
End: 2021-03-25
Payer: COMMERCIAL

## 2021-03-25 DIAGNOSIS — M54.42 ACUTE LEFT-SIDED LOW BACK PAIN WITH LEFT-SIDED SCIATICA: Primary | ICD-10-CM

## 2021-03-25 PROCEDURE — 97110 THERAPEUTIC EXERCISES: CPT | Mod: GP | Performed by: PHYSICAL THERAPIST

## 2021-03-25 PROCEDURE — 97140 MANUAL THERAPY 1/> REGIONS: CPT | Mod: GP | Performed by: PHYSICAL THERAPIST

## 2021-08-21 PROBLEM — M54.2 CERVICALGIA: Status: RESOLVED | Noted: 2020-02-05 | Resolved: 2021-08-21

## 2021-08-21 PROBLEM — M54.42 ACUTE LEFT-SIDED LOW BACK PAIN WITH LEFT-SIDED SCIATICA: Status: RESOLVED | Noted: 2019-11-28 | Resolved: 2021-08-21

## 2021-08-21 NOTE — PROGRESS NOTES
DISCHARGE REPORT    Liliya did not return for further treatment after the last visit on 3/25/21.   Current status is unknown.  Please see information below for last known relevant information.  Patient seen for 33 visits.    SUBJECTIVE  Subjective changes noted by patient:  Notes tightness/discomfort in thoracic and lumbar spine and paraspinal muscles.  Drove to [place] as her mother fell off a ladder, so driving and staying in a hotel as well as sitting at home prior to that ([place] due to possible covid exposure prior).  More stiff, relates to those.   .  Current pain level is 5/10.     Previous pain level was  7/10.   Changes in function: (See Goal flowsheet attached for changes in current functional level)  Adverse reaction to treatment or activity: None    OBJECTIVE  Changes noted in objective findings:   Forward bend negative, flexion to ankles.  Tightness B paraspinals.      ASSESSMENT/PLAN  Diagnosis: acute L central LBP, L glute/ intermittent post thigh above knee   Updated problem list and treatment plan:  Patient will continue to utilize HEP for any remaining deficits.   STG/LTGs have been met or progress has been made towards goals:  Please see goal flowsheet for most current information  Assessment of Progress: current status is unknown.    Last current status: Pt is progressing as expected   Self Management Plans:  HEP  I have re-evaluated this patient and find that the nature, scope, duration and intensity of the therapy is appropriate for the medical condition of the patient.  Liliya continues to require the following intervention to meet STG and LTG's:  HEP.    Recommendations:  Discharge with current home program.  Patient to follow up with MD as needed.    Please refer to the daily flowsheet for treatment today, total treatment time and time spent performing 1:1 timed codes.

## 2022-10-07 ENCOUNTER — THERAPY VISIT (OUTPATIENT)
Dept: PHYSICAL THERAPY | Facility: CLINIC | Age: 52
End: 2022-10-07
Payer: COMMERCIAL

## 2022-10-07 DIAGNOSIS — M25.572 PAIN IN JOINT INVOLVING ANKLE AND FOOT, LEFT: Primary | ICD-10-CM

## 2022-10-07 PROCEDURE — 97140 MANUAL THERAPY 1/> REGIONS: CPT | Mod: GP | Performed by: PHYSICAL THERAPIST

## 2022-10-07 PROCEDURE — 97161 PT EVAL LOW COMPLEX 20 MIN: CPT | Mod: GP | Performed by: PHYSICAL THERAPIST

## 2022-10-07 PROCEDURE — 97110 THERAPEUTIC EXERCISES: CPT | Mod: GP | Performed by: PHYSICAL THERAPIST

## 2022-10-07 NOTE — PROGRESS NOTES
Physical Therapy Initial Evaluation - Ankle    Therapist Impression:  Patient presents with signs and symptoms consistent with L ankle fracture secondary to talus fracutre. Patient demonstrates impairments including decreased L ankle and forefoot mobility, with increased P upon walking. Patient's functional limitations include walking, moving, standing and any functional movement.    Subjective:  Liliya Avilez is a 52 year old female. HPI given by patient  Patient's chief complaints: She was NWB for 3 weeks and pushed it a little bit, second MRI said minimal to no healing, Late august she was in the boot and the heel was hurting for the entire time she was in the boot.   Condition occurred due to at the dog park with her 75lb dog and she fell over and was hit from behind. Also having L knee pain symptoms   Date of Onset: 5/30/22  Location of symptoms is pad of the foot, between big toe, outside of the 5th met, bottom of the heel   Symptoms other than pain include: sharp pains in bottom of the foot and outside     Pain dependence on time of day is: hurts in the morning when she wakes up.   Pain rating is: 7/10.    Symptoms are exacerbated by: walking in her shoes, .    Symptoms are relieved by:  Massaging the foot, IBU.    Progression of symptoms is that symptoms are:  Slightly better, still pretty painful.    Imaging/Special tests include: Though she was improving, a second MRI was obtained on 09/18/2022. This reveals interval healing of the nondisplaced fracture at the plantar aspect of the talar head and neck.     Previous treatments include: PT at [facility] for 2 visits in August and Sept.   Patient reports that general health is: good.     Current occupation is: [employer]   Primary job tasks include: on her feet all day, lifting, carrying    Red flags include: none at this time, daily increase of P symptoms    Patient's expectations for therapy include: feel better, decrease P symptoms    Medical allergies  includes: .   Sulfa drugs, Amoxicillin, Codeine, Nickel, and Thimerosal    Current Outpatient Medications:      meclizine (ANTIVERT) 25 MG tablet, Take 1 tablet (25 mg) by mouth every 6 hours as needed for dizziness, Disp: 20 tablet, Rfl: 0     ondansetron (ZOFRAN ODT) 4 MG ODT tab, Take 1 tablet (4 mg) by mouth every 8 hours as needed for nausea, Disp: 10 tablet, Rfl: 0    ANKLE: WFL ankle range of motion     PROM L PROM R AROM L AROM R MMT L MMT R   Dorsiflexion         Plantarflexion         Inversion P upon overpressure        Eversion   P at end range, at talus  P     G Toe Ext         G Toe Flex           Edema:  None present    Palpation: TTP at heel pad, distal plantar attachment, base of great toe and 5th met, large bunion on LLE    Gait: impaired gait on barefoot including decreased push off and increased P    Assessment/Plan:    Patient is a 52 year old female with left side ankle complaints.    Patient has the following significant findings with corresponding treatment plan.                Diagnosis 1:  L ankle fracture  Pain -  hot/cold therapy, US, electric stimulation, manual therapy and splint/taping/bracing/orthotics  Decreased ROM/flexibility - manual therapy and therapeutic exercise  Decreased joint mobility - manual therapy and therapeutic exercise  Decreased strength - therapeutic exercise and therapeutic activities  Impaired balance - neuro re-education and therapeutic activities  Impaired gait - gait training    Therapy Evaluation Codes:   Cumulative Therapy Evaluation is: Low complexity.    Previous and current functional limitations:  (See Goal Flow Sheet for this information)    Short term and Long term goals: (See Goal Flow Sheet for this information)     Communication ability:  Patient appears to be able to clearly communicate and understand verbal and written communication and follow directions correctly.  Treatment Explanation - The following has been discussed with the patient:   RX  ordered/plan of care  Anticipated outcomes  Possible risks and side effects  This patient would benefit from PT intervention to resume normal activities.   Rehab potential is fair.    Frequency:  1 X week, once daily  Duration:  for 4 weeks tapering to 2 X a month over 6 weeks  Discharge Plan:  Achieve all LTG.  Independent in home treatment program.  Reach maximal therapeutic benefit.    Please refer to the daily flowsheet for treatment today, total treatment time and time spent performing 1:1 timed codes.     Evonne Real, PT

## 2022-10-28 ENCOUNTER — THERAPY VISIT (OUTPATIENT)
Dept: PHYSICAL THERAPY | Facility: CLINIC | Age: 52
End: 2022-10-28
Payer: COMMERCIAL

## 2022-10-28 DIAGNOSIS — M25.572 PAIN IN JOINT INVOLVING ANKLE AND FOOT, LEFT: Primary | ICD-10-CM

## 2022-10-28 PROCEDURE — 97035 APP MDLTY 1+ULTRASOUND EA 15: CPT | Mod: GP | Performed by: PHYSICAL THERAPIST

## 2022-10-28 PROCEDURE — 97110 THERAPEUTIC EXERCISES: CPT | Mod: GP | Performed by: PHYSICAL THERAPIST

## 2022-11-09 ENCOUNTER — THERAPY VISIT (OUTPATIENT)
Dept: PHYSICAL THERAPY | Facility: CLINIC | Age: 52
End: 2022-11-09
Payer: COMMERCIAL

## 2022-11-09 DIAGNOSIS — M25.572 PAIN IN JOINT INVOLVING ANKLE AND FOOT, LEFT: Primary | ICD-10-CM

## 2022-11-09 PROCEDURE — 97035 APP MDLTY 1+ULTRASOUND EA 15: CPT | Mod: GP | Performed by: PHYSICAL THERAPIST

## 2022-11-09 PROCEDURE — 97110 THERAPEUTIC EXERCISES: CPT | Mod: GP | Performed by: PHYSICAL THERAPIST

## 2022-11-18 ENCOUNTER — THERAPY VISIT (OUTPATIENT)
Dept: PHYSICAL THERAPY | Facility: CLINIC | Age: 52
End: 2022-11-18
Payer: COMMERCIAL

## 2022-11-18 DIAGNOSIS — M25.572 PAIN IN JOINT INVOLVING ANKLE AND FOOT, LEFT: Primary | ICD-10-CM

## 2022-11-18 PROCEDURE — 97035 APP MDLTY 1+ULTRASOUND EA 15: CPT | Mod: GP | Performed by: PHYSICAL THERAPIST

## 2022-11-18 PROCEDURE — 97110 THERAPEUTIC EXERCISES: CPT | Mod: GP | Performed by: PHYSICAL THERAPIST

## 2022-11-30 ENCOUNTER — THERAPY VISIT (OUTPATIENT)
Dept: PHYSICAL THERAPY | Facility: CLINIC | Age: 52
End: 2022-11-30
Payer: COMMERCIAL

## 2022-11-30 DIAGNOSIS — M25.572 PAIN IN JOINT INVOLVING ANKLE AND FOOT, LEFT: Primary | ICD-10-CM

## 2022-11-30 PROCEDURE — 97110 THERAPEUTIC EXERCISES: CPT | Mod: GP | Performed by: PHYSICAL THERAPIST

## 2022-11-30 PROCEDURE — 97140 MANUAL THERAPY 1/> REGIONS: CPT | Mod: GP | Performed by: PHYSICAL THERAPIST

## 2022-12-14 ENCOUNTER — THERAPY VISIT (OUTPATIENT)
Dept: PHYSICAL THERAPY | Facility: CLINIC | Age: 52
End: 2022-12-14
Payer: COMMERCIAL

## 2022-12-14 DIAGNOSIS — M25.572 PAIN IN JOINT INVOLVING ANKLE AND FOOT, LEFT: Primary | ICD-10-CM

## 2022-12-14 DIAGNOSIS — M54.2 CERVICALGIA: ICD-10-CM

## 2022-12-14 DIAGNOSIS — M54.42 ACUTE LEFT-SIDED LOW BACK PAIN WITH LEFT-SIDED SCIATICA: ICD-10-CM

## 2022-12-14 PROCEDURE — 97035 APP MDLTY 1+ULTRASOUND EA 15: CPT | Mod: GP | Performed by: PHYSICAL THERAPIST

## 2022-12-14 PROCEDURE — 97140 MANUAL THERAPY 1/> REGIONS: CPT | Mod: GP | Performed by: PHYSICAL THERAPIST

## 2023-06-28 ENCOUNTER — THERAPY VISIT (OUTPATIENT)
Dept: PHYSICAL THERAPY | Facility: CLINIC | Age: 53
End: 2023-06-28
Payer: COMMERCIAL

## 2023-06-28 DIAGNOSIS — M79.672 LEFT FOOT PAIN: Primary | ICD-10-CM

## 2023-06-28 DIAGNOSIS — M25.572 PAIN IN JOINT INVOLVING ANKLE AND FOOT, LEFT: ICD-10-CM

## 2023-06-28 PROCEDURE — 97110 THERAPEUTIC EXERCISES: CPT | Mod: GP | Performed by: PHYSICAL THERAPIST

## 2023-06-28 PROCEDURE — 97161 PT EVAL LOW COMPLEX 20 MIN: CPT | Mod: GP | Performed by: PHYSICAL THERAPIST

## 2023-06-28 NOTE — PROGRESS NOTES
PHYSICAL THERAPY EVALUATION  Type of Visit: Evaluation    See electronic medical record for Abuse and Falls Screening details.    Subjective : Patient broke her 5th metetarsal on 5/25 and is now in a boot, the MD says she is now able to start walking without the boot this week but should be doing therapy 2x/week. Patient hasn't needed crutches this week. She can transition to sturdy shoes     Date of onset: 05/25/23      Prior diagnostic imaging/testing results:     5th met fracture  Prior therapy history for the same diagnosis, illness or injury: Yes PT in the fall for L foot pain    Employment:    , not working for the summer, doing exercise classes    Pain assessment: Pain present  See objective evaluation for additional pain details     Objective     FOOT/ANKLE EVALUATION  PAIN: Pain Location: 5th met, lateral foot  Pain Quality: sharp pains, achy pains on daily basis  Pain Frequency: random pains sometimes with walking in boot  Pain is Worst: with weight bearing  Pain is Exacerbated By: going up the hill, weight bearing   Pain is Relieved By: rest  Pain Progression: better than it was    GAIT:   Assistive Device(s): Brace, Crutches (bilateral), crutches as needed  Gait Deviations: Antalgic    ROM: L limited in Eversion at end range due to 5th met Pain, otherwise WFL    Strength:   Pain: - none + mild ++ moderate +++ severe  Strength Scale: 0-5/5 Left Right   Ankle Dorsiflexion 4    Ankle Plantarflexion     Ankle Inversion 4    Ankle Eversion 4    Great Toe Flexion 4    Great Toe Extension 4    Anterior Tibialis     Posterior Tibialis     Peroneals 4    Extensor Digitorum     Gastroc/Soleus 3-      FLEXIBILITY: decreased of gastroc/soleus, hamstrings, anterior tib  PALPATION: TTP at 5th Met on LLE  JOINT MOBILITY: decreased of ankle joint, midfoot    Assessment & Plan   CLINICAL IMPRESSIONS   Medical Diagnosis: L 5th met fracture    Treatment Diagnosis: L foot pain   Impression/Assessment: Patient is  a 53 year old female with L foot complaints.  The following significant findings have been identified: Pain, Decreased ROM/flexibility, Decreased joint mobility, Decreased strength, Impaired balance, Impaired gait and Decreased activity tolerance. These impairments interfere with their ability to perform self care tasks, work tasks, recreational activities, household chores, household mobility and community mobility as compared to previous level of function.     Clinical Decision Making (Complexity):   Clinical Presentation: Stable/Uncomplicated  Clinical Presentation Rationale: based on medical and personal factors listed in PT evaluation  Clinical Decision Making (Complexity): Low complexity    PLAN OF CARE  Treatment Interventions:  Modalities: Cryotherapy, E-stim, Ultrasound  Interventions: Gait Training, Manual Therapy, Neuromuscular Re-education, Therapeutic Activity, Therapeutic Exercise, Self-Care/Home Management    Long Term Goals     PT Goal 1  Goal Identifier: ambulation  Goal Description: Patient will be able to ambulate for at least 1 hour in her tennis shoes without increased L foot pain in order to ambulate in the community safely  Goal Progress: Baseline: 2 min in [place] before sharp pains  Target Date: 08/27/23  PT Goal 2  Goal Identifier: Calf raises  Goal Description: Patient will be able to perform 30 Single leg calf raises on LLE without increased P or difficulty in order to regain strength to 5/5 for community ambulation  Goal Progress: Baseline: unable to perform  Target Date: 09/25/23      Frequency of Treatment: 1x/week  Duration of Treatment: 10 weeks    Education Assessment:   Learner/Method: Patient;Listening;Reading;Demonstration;Pictures/Video  Education Comments: EDU on activity modification, that it will be up to her to what she feels on what she can/cannot do, to not push into P symptoms    Risks and benefits of evaluation/treatment have been explained.   Patient/Family/caregiver  agrees with Plan of Care.     Evaluation Time:     PT Eval, Low Complexity Minutes (76381): 12    Signing Clinician: RAZ CHISHOLM PT

## 2023-06-30 ENCOUNTER — THERAPY VISIT (OUTPATIENT)
Dept: PHYSICAL THERAPY | Facility: CLINIC | Age: 53
End: 2023-06-30
Payer: COMMERCIAL

## 2023-06-30 DIAGNOSIS — M79.672 LEFT FOOT PAIN: ICD-10-CM

## 2023-06-30 PROCEDURE — 97110 THERAPEUTIC EXERCISES: CPT | Mod: GP | Performed by: PHYSICAL THERAPIST

## 2023-06-30 PROCEDURE — 97530 THERAPEUTIC ACTIVITIES: CPT | Mod: GP | Performed by: PHYSICAL THERAPIST

## 2023-07-05 ENCOUNTER — THERAPY VISIT (OUTPATIENT)
Dept: PHYSICAL THERAPY | Facility: CLINIC | Age: 53
End: 2023-07-05
Payer: COMMERCIAL

## 2023-07-05 DIAGNOSIS — M79.672 LEFT FOOT PAIN: Primary | ICD-10-CM

## 2023-07-05 PROCEDURE — 97110 THERAPEUTIC EXERCISES: CPT | Mod: GP | Performed by: PHYSICAL THERAPIST

## 2023-07-05 PROCEDURE — 97140 MANUAL THERAPY 1/> REGIONS: CPT | Mod: GP | Performed by: PHYSICAL THERAPIST

## 2023-07-11 ENCOUNTER — THERAPY VISIT (OUTPATIENT)
Dept: PHYSICAL THERAPY | Facility: CLINIC | Age: 53
End: 2023-07-11
Payer: COMMERCIAL

## 2023-07-11 DIAGNOSIS — M79.672 LEFT FOOT PAIN: Primary | ICD-10-CM

## 2023-07-11 PROCEDURE — 97110 THERAPEUTIC EXERCISES: CPT | Mod: GP | Performed by: PHYSICAL THERAPIST

## 2023-07-11 PROCEDURE — 97140 MANUAL THERAPY 1/> REGIONS: CPT | Mod: GP | Performed by: PHYSICAL THERAPIST

## 2023-07-24 ENCOUNTER — THERAPY VISIT (OUTPATIENT)
Dept: PHYSICAL THERAPY | Facility: CLINIC | Age: 53
End: 2023-07-24
Payer: COMMERCIAL

## 2023-07-24 DIAGNOSIS — M79.672 LEFT FOOT PAIN: Primary | ICD-10-CM

## 2023-07-24 PROCEDURE — 97140 MANUAL THERAPY 1/> REGIONS: CPT | Mod: GP | Performed by: PHYSICAL THERAPIST

## 2023-07-24 PROCEDURE — 97110 THERAPEUTIC EXERCISES: CPT | Mod: GP | Performed by: PHYSICAL THERAPIST

## 2023-08-01 ENCOUNTER — THERAPY VISIT (OUTPATIENT)
Dept: PHYSICAL THERAPY | Facility: CLINIC | Age: 53
End: 2023-08-01
Payer: COMMERCIAL

## 2023-08-01 DIAGNOSIS — M79.672 LEFT FOOT PAIN: Primary | ICD-10-CM

## 2023-08-01 PROCEDURE — 97110 THERAPEUTIC EXERCISES: CPT | Mod: GP | Performed by: PHYSICAL THERAPIST

## 2023-08-28 ENCOUNTER — THERAPY VISIT (OUTPATIENT)
Dept: PHYSICAL THERAPY | Facility: CLINIC | Age: 53
End: 2023-08-28
Payer: COMMERCIAL

## 2023-08-28 DIAGNOSIS — M79.672 LEFT FOOT PAIN: Primary | ICD-10-CM

## 2023-08-28 PROCEDURE — 97110 THERAPEUTIC EXERCISES: CPT | Mod: GP | Performed by: PHYSICAL THERAPIST

## 2023-08-28 NOTE — PROGRESS NOTES
DISCHARGE  Reason for Discharge: Patient has met all goals.    Equipment Issued: none    Discharge Plan: Patient to continue home program.    Referring Provider:  Debbie Walton     08/28/23 0500   Appointment Info   Signing clinician's name / credentials Morena King PT [facility]   Total/Authorized Visits 10 E&T   Visits Used 7   Medical Diagnosis L 5th met fracture   PT Tx Diagnosis L foot pain   Other pertinent information Donnie Vivasi, Hx left ankle fx ~ a year ago   Progress Note/Certification   Onset of illness/injury or Date of Surgery 05/25/23   Therapy Frequency 1x/week   Predicted Duration 10 weeks   GOALS   PT Goals 2   PT Goal 1   Goal Identifier ambulation   Goal Description Patient will be able to ambulate for at least 1 hour in her tennis shoes without increased L foot pain in order to ambulate in the community safely   Goal Progress has tolerated 1 mi walk without foot pain   Target Date 08/27/23   Date Met 08/28/23   PT Goal 2   Goal Identifier Calf raises   Goal Description Patient will be able to perform 30 Single leg calf raises on LLE without increased P or difficulty in order to regain strength to 5/5 for community ambulation   Goal Progress can do 30 single toe raises - mild gr toe complaint but no lateral foot or calf complaint   Target Date 09/25/23   Date Met 08/28/23   Subjective Report   Subjective Report [place] including portaging without any foot pain (wore hiking boots).  No symptoms with community ambulation   Objective Measures   Objective Measures Objective Measure 1;Objective Measure 2   Objective Measure 1   Objective Measure late.  NL gait pattern.  No lateral foot or calf pain with single toe raises.  Left ankle ROM WNL.  Tolerated ankle strengthening ex well.  Has mild knee c/o with squat and lunge exercise but no foot/ankle complaint   Treatment Interventions (PT)   Interventions Therapeutic Procedure/Exercise;Neuromuscular Re-education;Manual  Therapy;Therapeutic Activity   Therapeutic Procedure/Exercise   Therapeutic Procedures: strength, endurance, ROM, flexibillity minutes (75425) 30   PTRx Ther Proc 1 Single Leg Chair Squat   PTRx Ther Proc 1 - Details when 30 is easy add 6 oz weight then 1 lb   PTRx Ther Proc 2 Lateral Lunges   PTRx Ther Proc 2 - Details 5 x each way working up to 10   PTRx Ther Proc 3 Functional Lunge Forward   PTRx Ther Proc 3 - Details 5 working up to 10   PTRx Ther Proc 4 Star Exercise   PTRx Ther Proc 4 - Details 3x each ray each leg   PTRx Ther Proc 5 Side Stepping With Theraband   PTRx Ther Proc 5 - Details to fatigue with red theraband at ankles   PTRx Ther Proc 6 Standing Soleus Stretch   PTRx Ther Proc 6 - Details 3x with 15-20 sec hold   PTRx Ther Proc 7 Standing Gastroc Stretch   PTRx Ther Proc 7 - Details 3x with 15-20sec hold   PTRx Ther Proc 8 Theraband Ankle Plantarflexion   PTRx Ther Proc 8 - Details 10-20 reps   PTRx Ther Proc 9 Ankle Eversion Strengthening With Theraband   PTRx Ther Proc 9 - Details 10-20 repsprogressed to green theraband   PTRx Ther Proc 10 Theraband Ankle Inversion   PTRx Ther Proc 10 - Details 10-20 reps    PTRx Ther Proc 11 Standing Weight Shift   PTRx Ther Proc 11 - Details 10-20 reps each way   PTRx Ther Proc 12 Toe Raises   PTRx Ther Proc 12 - Details 10-20x single leg now as tolerated with shoes   PTRx Ther Proc 13 Prone Plank   PTRx Ther Proc 13 - Details No Notes   PTRx Ther Proc 14 Long Sit Gastroc Stretch With Towel   PTRx Ther Proc 14 - Details No Notes   PTRx Ther Proc 15 Standing Gastroc Stretch   PTRx Ther Proc 15 - Details No Notes   PTRx Ther Proc 16 Squat   PTRx Ther Proc 16 - Details think butt back knees stay behind toes keep knees apart don't let them turn in towards eachother   PTRx Ther Proc 17 Towel Gather   PTRx Ther Proc 17 - Details No Notes   PTRx Ther Proc 18 Soleus Stretch on Chair   PTRx Ther Proc 18 - Details No Notes   PTRx Ther Proc 19 Single Leg Standing Deadlift    PTRx Ther Proc 19 - Details 10x each leg   Therapeutic Activity   PTRx Ther Act 1 Education Sheet General   PTRx Ther Act 1 - Details stand on left leg.  Lift light weight in right UE up and over left shoulder.  Make more challenging by following weight with your eyes   PTRx Ther Act 2 Stair Step Ups   PTRx Ther Act 2 - Details weight through heel and drive up with [id number]   Neuromuscular Re-education   PTRx Neuro Re-ed 1 Balance Single Leg Stance Supported and Unsupported   PTRx Neuro Re-ed 1 - Details No Notes   Education   Learner/Method Patient;Listening;Reading;Demonstration;Pictures/Video   Education Comments EDU on activity modification, that it will be up to her to what she feels on what she can/cannot do, to not push into P symptoms   Plan   Plan for next session skaters leap? star balance, glut strengthening   Comments   Comments [place] trip 8/12.  Wants to return to jumping   Total Session Time   Timed Code Treatment Minutes 30   Total Treatment Time (sum of timed and untimed services) 30

## 2023-08-28 NOTE — PROGRESS NOTES
.[email or web address]   08/28/23 0500   Appointment Info   Signing clinician's name / credentials Morena King PT [facility]   Total/Authorized Visits 10 E&T   Visits Used 7   Medical Diagnosis L 5th met fracture   PT Tx Diagnosis L foot pain   Other pertinent information Donnie Lomeli, Hx left ankle fx ~ a year ago   Progress Note/Certification   Onset of illness/injury or Date of Surgery 05/25/23   Therapy Frequency 1x/week   Predicted Duration 10 weeks   GOALS   PT Goals 2   PT Goal 1   Goal Identifier ambulation   Goal Description Patient will be able to ambulate for at least 1 hour in her tennis shoes without increased L foot pain in order to ambulate in the community safely   Goal Progress has tolerated 1 mi walk without foot pain   Target Date 08/27/23   Date Met 08/28/23   PT Goal 2   Goal Identifier Calf raises   Goal Description Patient will be able to perform 30 Single leg calf raises on LLE without increased P or difficulty in order to regain strength to 5/5 for community ambulation   Goal Progress can do 30 single toe raises - mild gr toe complaint but no lateral foot or calf complaint   Target Date 09/25/23   Date Met 08/28/23   Subjective Report   Subjective Report [place] including portaging without any foot pain (wore hiking boots).  No symptoms with community ambulation   Objective Measures   Objective Measures Objective Measure 1;Objective Measure 2   Objective Measure 1   Objective Measure late.  NL gait pattern.  No lateral foot or calf pain with single toe raises.  Left ankle ROM WNL.  Tolerated ankle strengthening ex well.  Has mild knee c/o with squat and lunge exercise but no foot/ankle complaint   Treatment Interventions (PT)   Interventions Therapeutic Procedure/Exercise;Neuromuscular Re-education;Manual Therapy;Therapeutic Activity   Therapeutic Procedure/Exercise   Therapeutic Procedures: strength, endurance, ROM, flexibillity minutes (43635) 30   PTRx Ther Proc 1 Single Leg Chair Squat    PTRx Ther Proc 1 - Details when 30 is easy add 6 oz weight then 1 lb   PTRx Ther Proc 2 Lateral Lunges   PTRx Ther Proc 2 - Details 5 x each way working up to 10   PTRx Ther Proc 3 Functional Lunge Forward   PTRx Ther Proc 3 - Details 5 working up to 10   PTRx Ther Proc 4 Star Exercise   PTRx Ther Proc 4 - Details 3x each ray each leg   PTRx Ther Proc 5 Side Stepping With Theraband   PTRx Ther Proc 5 - Details to fatigue with red theraband at ankles   PTRx Ther Proc 6 Standing Soleus Stretch   PTRx Ther Proc 6 - Details 3x with 15-20 sec hold   PTRx Ther Proc 7 Standing Gastroc Stretch   PTRx Ther Proc 7 - Details 3x with 15-20sec hold   PTRx Ther Proc 8 Theraband Ankle Plantarflexion   PTRx Ther Proc 8 - Details 10-20 reps   PTRx Ther Proc 9 Ankle Eversion Strengthening With Theraband   PTRx Ther Proc 9 - Details 10-20 repsprogressed to green theraband   PTRx Ther Proc 10 Theraband Ankle Inversion   PTRx Ther Proc 10 - Details 10-20 reps    PTRx Ther Proc 11 Standing Weight Shift   PTRx Ther Proc 11 - Details 10-20 reps each way   PTRx Ther Proc 12 Toe Raises   PTRx Ther Proc 12 - Details 10-20x single leg now as tolerated with shoes   PTRx Ther Proc 13 Prone Plank   PTRx Ther Proc 13 - Details No Notes   PTRx Ther Proc 14 Long Sit Gastroc Stretch With Towel   PTRx Ther Proc 14 - Details No Notes   PTRx Ther Proc 15 Standing Gastroc Stretch   PTRx Ther Proc 15 - Details No Notes   PTRx Ther Proc 16 Squat   PTRx Ther Proc 16 - Details think butt back knees stay behind toes keep knees apart don't let them turn in towards eachother   PTRx Ther Proc 17 Towel Gather   PTRx Ther Proc 17 - Details No Notes   PTRx Ther Proc 18 Soleus Stretch on Chair   PTRx Ther Proc 18 - Details No Notes   PTRx Ther Proc 19 Single Leg Standing Deadlift   PTRx Ther Proc 19 - Details 10x each leg   Therapeutic Activity   PTRx Ther Act 1 Education Sheet General   PTRx Ther Act 1 - Details stand on left leg.  Lift light weight in right UE  up and over left shoulder.  Make more challenging by following weight with your eyes   PTRx Ther Act 2 Stair Step Ups   PTRx Ther Act 2 - Details weight through heel and drive up with [id number]   Neuromuscular Re-education   PTRx Neuro Re-ed 1 Balance Single Leg Stance Supported and Unsupported   PTRx Neuro Re-ed 1 - Details No Notes   Education   Learner/Method Patient;Listening;Reading;Demonstration;Pictures/Video   Education Comments EDU on activity modification, that it will be up to her to what she feels on what she can/cannot do, to not push into P symptoms   Plan   Plan for next session skaters leap? star balance, glut strengthening   Comments   Comments [place] trip 8/12.  Wants to return to jumping   Total Session Time   Timed Code Treatment Minutes 30   Total Treatment Time (sum of timed and untimed services) 30

## 2024-08-25 ENCOUNTER — HEALTH MAINTENANCE LETTER (OUTPATIENT)
Age: 54
End: 2024-08-25